# Patient Record
(demographics unavailable — no encounter records)

---

## 2025-03-21 NOTE — HISTORY OF PRESENT ILLNESS
[FreeTextEntry1] : 74 year old male with PMHx of HTN, HLD presents for a cardiac evaluation for an abnormal ECG. Patient very active and has no chest pain, dyspnea or palpitations. Notes that his BP has been running high, >140mmHg systolic. No previous pharmacologic therapy for HTN.  He is complaining of left leg cramping at night.  There is no history of MI, CVA, CHF, or previous coronary intervention.

## 2025-03-21 NOTE — PHYSICAL EXAM
[Normal] : moves all extremities, no focal deficits, normal speech [de-identified] :  No carotid bruits auscultated bilaterally.

## 2025-03-21 NOTE — DISCUSSION/SUMMARY
[FreeTextEntry1] : 1. Abnormal ECG: recommend echocardiogram.  2. HTN: Goal BP less than 130/80. Recommend low salt diet. Recommend starting losartan 50mg daily.  3. HLD: recommend continuing rosuvastatin 10mg nightly.  4. Leg Pain: recommend ABIs  Follow up in 1 month [EKG obtained to assist in diagnosis and management of assessed problem(s)] : EKG obtained to assist in diagnosis and management of assessed problem(s)

## 2025-05-09 NOTE — HISTORY OF PRESENT ILLNESS
Clinic note faxed to Dr. Vicky Hu at [facility] ([facility])   Sheyla Stover CMA Rheumatology  9/25/2020 7:52 AM    
[FreeTextEntry1] : Historical Perspective: 74 year old male with PMHx of HTN, HLD presents for a cardiac evaluation for an abnormal ECG. Patient very active and has no chest pain, dyspnea or palpitations. Notes that his BP has been running high, >140mmHg systolic. No previous pharmacologic therapy for HTN.  He is complaining of left leg cramping at night.  There is no history of MI, CVA, CHF, or previous coronary intervention.  Current Health Status: Patient with no chest pain, SOB, or palpitations. No hospitalizations since seeing me last. Remains compliant with medications and reports no adverse effects.

## 2025-05-09 NOTE — DISCUSSION/SUMMARY
[FreeTextEntry1] : 1. TAA: measures 4.2cm on echocardiogram. Periodic echo surveillance. Goal BP less than 130/80.  2. HTN: Goal BP less than 130/80. Recommend low salt diet. Recommend increasing losartan 50mg daily-->100mg daily  3. HLD: recommend continuing rosuvastatin 10mg nightly.  4. Leg Pain: normal ABIs, 4/30/2025  Follow up in 6 months.

## 2025-05-09 NOTE — CARDIOLOGY SUMMARY
[de-identified] : 3/21/2025, NSR with non-specific T wave changes [de-identified] : 4/30/2025, LV EF 61%, mild-moderate MR, mild TR, estimated PASP of 32mmHg, TAA at 4.2cm [de-identified] : 4/30/2025, YAA: normal

## 2025-05-09 NOTE — PHYSICAL EXAM
[Normal] : moves all extremities, no focal deficits, normal speech [de-identified] :  No carotid bruits auscultated bilaterally.